# Patient Record
Sex: MALE | Race: WHITE | NOT HISPANIC OR LATINO | Employment: OTHER | URBAN - METROPOLITAN AREA
[De-identification: names, ages, dates, MRNs, and addresses within clinical notes are randomized per-mention and may not be internally consistent; named-entity substitution may affect disease eponyms.]

---

## 2021-09-28 ENCOUNTER — CARE COORDINATION (OUTPATIENT)
Dept: CARDIOLOGY | Facility: CLINIC | Age: 56
End: 2021-09-28

## 2021-10-11 ENCOUNTER — CARE COORDINATION (OUTPATIENT)
Dept: CARDIOLOGY | Facility: CLINIC | Age: 56
End: 2021-10-11

## 2021-10-11 NOTE — PROGRESS NOTES
S/B: Patient being referred for consideration of advanced treatments. Referred by Sasha Castorena PA-C Phone: [phone]; Fax: [phone]. Pt has H/x of severe nonischemic cardiomyopathy, chronic systolic CHF, and CKD. He has a hx of atrial flutter and prior hx of PE, on chronic anticoagulation    A/P:

## 2021-10-15 NOTE — PROGRESS NOTES
"Patient is referred by [facility] for systolic heart failure.      10/15/21 - Called and spoke with patient this morning. He reported he had a procedure recently and his symptoms have improved and he feels \"much better.\" He has an appt soon with his primary cardiologist to discuss further, so at this time he declined to schedule appt with us.  If his symptoms change, he will have referring cardiologist send new referral. Closing this encounter.  "